# Patient Record
Sex: FEMALE | Race: WHITE | URBAN - METROPOLITAN AREA
[De-identification: names, ages, dates, MRNs, and addresses within clinical notes are randomized per-mention and may not be internally consistent; named-entity substitution may affect disease eponyms.]

---

## 2024-04-30 ENCOUNTER — OFFICE (OUTPATIENT)
Dept: URBAN - METROPOLITAN AREA CLINIC 79 | Facility: CLINIC | Age: 54
End: 2024-04-30

## 2024-04-30 VITALS
HEIGHT: 60 IN | SYSTOLIC BLOOD PRESSURE: 126 MMHG | WEIGHT: 162 LBS | DIASTOLIC BLOOD PRESSURE: 85 MMHG | TEMPERATURE: 97.9 F | HEART RATE: 70 BPM

## 2024-04-30 DIAGNOSIS — K58.0 IRRITABLE BOWEL SYNDROME WITH DIARRHEA: ICD-10-CM

## 2024-04-30 PROCEDURE — 99203 OFFICE O/P NEW LOW 30 MIN: CPT | Performed by: INTERNAL MEDICINE

## 2025-01-22 ENCOUNTER — TELEHEALTH PROVIDED IN PATIENT'S HOME (OUTPATIENT)
Dept: URBAN - METROPOLITAN AREA TELEHEALTH 12 | Facility: TELEHEALTH | Age: 55
End: 2025-01-22

## 2025-01-22 VITALS — HEIGHT: 60 IN | WEIGHT: 167 LBS

## 2025-01-22 DIAGNOSIS — K58.0 IRRITABLE BOWEL SYNDROME WITH DIARRHEA: ICD-10-CM

## 2025-01-22 PROCEDURE — 99214 OFFICE O/P EST MOD 30 MIN: CPT | Mod: 95 | Performed by: INTERNAL MEDICINE

## 2025-01-22 NOTE — SERVICENOTES
Patient was located in their home during visit., Patient's visit was conducted through [employer] video telecommunication. Patient consented before the start of visit as to understanding of privacy concerns, possible technological failure, and their responsibility of carrying out instructions of plan.

## 2025-01-22 NOTE — SERVICEHPINOTES
PATIENT VERIFIED BY DATE OF BIRTH AND NAME. Patient has been consented for this telecommunication visit using [employer] application.  Took 2 tablets once and says she developed constipation so then she stopped it and returned to taking immodium.  Takes 4 tablets of immodum a week as needed.  Says she takes the immodium only if she develops diarrhea.  
br
gildardo No weight loss.  Denies constipaton.  No blood in stool. Tried benefiber and culturelle without any difference in her diarrhea, feels immodium work best.
gildardo ewing Did not have a chance to have cbc with diff redrawnbr
gildardo

## 2025-05-16 ENCOUNTER — TELEHEALTH PROVIDED IN PATIENT'S HOME (OUTPATIENT)
Dept: URBAN - METROPOLITAN AREA TELEHEALTH 3 | Facility: TELEHEALTH | Age: 55
End: 2025-05-16
Payer: MEDICARE

## 2025-05-16 VITALS — WEIGHT: 175 LBS | HEIGHT: 60 IN

## 2025-05-16 DIAGNOSIS — K90.89 OTHER INTESTINAL MALABSORPTION: ICD-10-CM

## 2025-05-16 DIAGNOSIS — K59.1 FUNCTIONAL DIARRHEA: ICD-10-CM

## 2025-05-16 PROCEDURE — 99214 OFFICE O/P EST MOD 30 MIN: CPT | Mod: 95 | Performed by: INTERNAL MEDICINE

## 2025-05-16 RX ORDER — COLESTIPOL HYDROCHLORIDE 1 G/1
TABLET, FILM COATED ORAL
Qty: 90 | Refills: 2 | Status: ACTIVE
Start: 2025-05-16

## 2025-05-16 NOTE — SERVICEHPINOTES
PATIENT VERIFIED BY DATE OF BIRTH AND NAME. Patient has been consented for this telecommunication visit using [employer] application.
gildardo ewing She is here for f/u regarding her IBS-D.  She had a C-scope in 11/2024, which revealed benign polyp (removed), random colon biopsies negative.  She's had 3 flare ups of diarrhea.  This can last up to a week, with a frequency of 8x a day.  Fatty greasy foods can trigger the diarrhea.  She's had neg stool studies.  No rectal bleeding. gildardo

## 2025-05-16 NOTE — SERVICENOTES
Patient was located in their home during visit., Patient's visit was conducted through [employer] video telecommunication. Patient consented before the start of visit as to understanding of privacy concerns, possible technological failure, and their responsibility of carrying out instructions of plan.

## 2025-06-17 ENCOUNTER — TELEHEALTH PROVIDED IN PATIENT'S HOME (OUTPATIENT)
Dept: URBAN - METROPOLITAN AREA TELEHEALTH 3 | Facility: TELEHEALTH | Age: 55
End: 2025-06-17
Payer: MEDICARE

## 2025-06-17 VITALS — HEIGHT: 60 IN | WEIGHT: 175 LBS

## 2025-06-17 DIAGNOSIS — K52.9 NONINFECTIVE GASTROENTERITIS AND COLITIS, UNSPECIFIED: ICD-10-CM

## 2025-06-17 PROCEDURE — 99214 OFFICE O/P EST MOD 30 MIN: CPT | Mod: 95 | Performed by: PHYSICIAN ASSISTANT

## 2025-06-17 NOTE — SERVICEHPINOTES
PATIENT VERIFIED BY DATE OF BIRTH AND NAME. Patient has been consented for this telecommunication visit using [employer] application.
gildardo ewing 55 yo female presents for f/u IBS/diarrhea. Last seen in May by Dr. Lezama and was put on colestipol - she is taking 2 pills in the morning. States she still has some diarrhea issues. She has noticed that her pain has improved on this medication. She hasn't been able to take the PM dosage very regularly due to her eating habits. She reports seeing a spot of blood recently. Was told at time of her prior colonoscopies that she has hemorrhoids. She currently reports 2 BMs per day, but some days can have up to 5. She notes certain of her TV dinners can cause more symptoms, although she notes that it is hard for her to identify triggers to her symptoms. She moved recently so hasn't been able to cook her own food. 
gildardo ewing She notes using an zahraa to track some of her symptoms and BMs. She had a C-scope in 11/2024, which revealed benign polyp (removed), random colon biopsies negative. She's had neg stool studies. 
gildardo ewing She vapes denies current cigarette smoking. 
gildardo ewing ROS as above, otherwise negative.gildardo

## 2025-06-17 NOTE — SERVICENOTES
Patient was located in their home during visit., Patient's visit was conducted through [employer] video telecommunication. Patient consented before the start of visit as to understanding of privacy concerns, possible technological failure, and their responsibility of carrying out instructions of plan., I spent 25 minutes today reviewing the chart, talking with the patient, reviewing previous results with patient, discussing plan, and documenting. Patient understands and agrees with plan. Questions/concerns addressed.

## 2025-07-15 ENCOUNTER — TELEHEALTH PROVIDED IN PATIENT'S HOME (OUTPATIENT)
Dept: URBAN - METROPOLITAN AREA TELEHEALTH 3 | Facility: TELEHEALTH | Age: 55
End: 2025-07-15
Payer: MEDICARE

## 2025-07-15 VITALS — HEIGHT: 60 IN | WEIGHT: 170 LBS

## 2025-07-15 DIAGNOSIS — K58.0 IRRITABLE BOWEL SYNDROME WITH DIARRHEA: ICD-10-CM

## 2025-07-15 PROCEDURE — 99215 OFFICE O/P EST HI 40 MIN: CPT | Mod: 95 | Performed by: PHYSICIAN ASSISTANT

## 2025-07-15 RX ORDER — RIFAXIMIN 550 MG/1
TABLET ORAL
Qty: 42 | Refills: 0 | Status: ACTIVE
Start: 2025-07-15

## 2025-07-15 NOTE — SERVICEHPINOTES
PATIENT VERIFIED BY DATE OF BIRTH AND NAME. Patient has been consented for this telecommunication visit using [employer] application.
gildardo

br54 yo female presents for f/u IBS/diarrhea. 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 She is taking it PRN. 
br
gildardo She started having constipation in her teens. She started to have more diarrhea issues in her 30s. She has diarrhea most days and gets gas and bloating frequently. She is cautious of certain foods (such as dairy) due to some worse symptoms. She gets abdominal pains related to BMs. Stools are brown. No food particles, no blood. 
br
gildardo Tried Xifaxan in 2024 - given by another provider. Thinks it helped somewhat, but not for very long. br
br No prior celiac testing to her knowledge. Not sure about Hep C screening either.
br
gildardo Diet has been somewhat processed.    She notes using an zahara to track some of her symptoms and BMs. She had a C-scope in 11/2024, which revealed benign polyp (removed), random colon biopsies negative. She's had neg stool studies. She vapes denies current cigarette smoking. ROS as above, otherwise negative. gildardo

## 2025-07-15 NOTE — SERVICENOTES
Patient was located in their home during visit., Patient's visit was conducted through [employer] video telecommunication. Patient consented before the start of visit as to understanding of privacy concerns, possible technological failure, and their responsibility of carrying out instructions of plan., I spent 30 minutes today reviewing the chart, talking with the patient, reviewing previous results with patient, discussing plan, and documenting. Patient understands and agrees with plan. Questions/concerns addressed.

## 2025-08-21 ENCOUNTER — TELEHEALTH PROVIDED IN PATIENT'S HOME (OUTPATIENT)
Dept: URBAN - METROPOLITAN AREA TELEHEALTH 7 | Facility: TELEHEALTH | Age: 55
End: 2025-08-21
Payer: MEDICARE

## 2025-08-21 VITALS — HEIGHT: 60 IN | WEIGHT: 179 LBS

## 2025-08-21 DIAGNOSIS — K58.0 IRRITABLE BOWEL SYNDROME WITH DIARRHEA: ICD-10-CM

## 2025-08-21 PROCEDURE — 99214 OFFICE O/P EST MOD 30 MIN: CPT | Mod: 95 | Performed by: PHYSICIAN ASSISTANT

## 2025-08-21 RX ORDER — DIPHENOXYLATE HYDROCHLORIDE AND ATROPINE SULFATE 2.5; .025 MG/1; MG/1
TABLET ORAL
Qty: 60 | Refills: 3 | Status: ACTIVE
Start: 2025-08-21